# Patient Record
Sex: FEMALE | Race: WHITE | NOT HISPANIC OR LATINO
[De-identification: names, ages, dates, MRNs, and addresses within clinical notes are randomized per-mention and may not be internally consistent; named-entity substitution may affect disease eponyms.]

---

## 2017-01-10 ENCOUNTER — APPOINTMENT (OUTPATIENT)
Dept: OBGYN | Facility: HOSPITAL | Age: 37
End: 2017-01-10

## 2017-05-30 ENCOUNTER — APPOINTMENT (OUTPATIENT)
Dept: DERMATOLOGY | Facility: CLINIC | Age: 37
End: 2017-05-30

## 2017-05-30 VITALS
DIASTOLIC BLOOD PRESSURE: 60 MMHG | SYSTOLIC BLOOD PRESSURE: 110 MMHG | WEIGHT: 140 LBS | HEIGHT: 66 IN | BODY MASS INDEX: 22.5 KG/M2

## 2019-01-04 ENCOUNTER — APPOINTMENT (OUTPATIENT)
Dept: UROLOGY | Facility: CLINIC | Age: 39
End: 2019-01-04
Payer: MEDICAID

## 2019-01-04 ENCOUNTER — APPOINTMENT (OUTPATIENT)
Dept: DERMATOLOGY | Facility: CLINIC | Age: 39
End: 2019-01-04
Payer: MEDICAID

## 2019-01-04 VITALS — HEIGHT: 66 IN | BODY MASS INDEX: 22.98 KG/M2 | WEIGHT: 143 LBS

## 2019-01-04 DIAGNOSIS — L30.9 DERMATITIS, UNSPECIFIED: ICD-10-CM

## 2019-01-04 DIAGNOSIS — L90.8 OTHER ATROPHIC DISORDERS OF SKIN: ICD-10-CM

## 2019-01-04 PROCEDURE — 99214 OFFICE O/P EST MOD 30 MIN: CPT | Mod: GC

## 2019-01-04 PROCEDURE — 99214 OFFICE O/P EST MOD 30 MIN: CPT

## 2019-01-04 NOTE — PHYSICAL EXAM
[Alert] : alert [Oriented x 3] : ~L oriented x 3 [Well Nourished] : well nourished [Conjunctiva Non-injected] : conjunctiva non-injected [No Visual Lymphadenopathy] : no visual  lymphadenopathy [No Clubbing] : no clubbing [No Edema] : no edema [No Bromhidrosis] : no bromhidrosis [No Chromhidrosis] : no chromhidrosis [FreeTextEntry3] : - b/l hands: diffuse xerosis with some linear fissures noted\par - b/l periorbital area: lichenified plaques with scaling\par - scalp: erythematous papules with overlying excoriation. No significant scale

## 2019-01-04 NOTE — HISTORY OF PRESENT ILLNESS
[FreeTextEntry1] : f/u: rash on hands, eyes [de-identified] : 39 y/o F h/o kidney stones presenting for following:\par 1) rash on hands - Very dry and itchy during winter. Using Lidex ointment PRN with improvement. Not moisturizing or using gloves with chores. No other associated sx or tx tried. \par 2) rash around eyes - Flares randomly 3-4x a year. Unclear triggers. Using Regenerist soap and [employer]uder face cream. Previously patch tested but doesn't remember the results. Washes hair every 2 days. Uses perfume but no makeup.\par 3) itchy scalp - diffuse, present for years. Scratches until the skin breaks. No F/C, NS, myalgias, arthralgias. \par 4) facial wrinkles - Wondering if there are any topical treatments to improve appearance. 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

## 2019-01-06 LAB — BACTERIA UR CULT: NORMAL

## 2019-01-08 ENCOUNTER — FORM ENCOUNTER (OUTPATIENT)
Age: 39
End: 2019-01-08

## 2019-01-08 ENCOUNTER — OUTPATIENT (OUTPATIENT)
Dept: OUTPATIENT SERVICES | Facility: HOSPITAL | Age: 39
LOS: 1 days | End: 2019-01-08
Payer: MEDICAID

## 2019-01-08 VITALS
WEIGHT: 143.08 LBS | HEIGHT: 67 IN | RESPIRATION RATE: 16 BRPM | DIASTOLIC BLOOD PRESSURE: 65 MMHG | OXYGEN SATURATION: 99 % | SYSTOLIC BLOOD PRESSURE: 96 MMHG | HEART RATE: 52 BPM | TEMPERATURE: 98 F

## 2019-01-08 DIAGNOSIS — N20.1 CALCULUS OF URETER: ICD-10-CM

## 2019-01-08 DIAGNOSIS — Z98.890 OTHER SPECIFIED POSTPROCEDURAL STATES: Chronic | ICD-10-CM

## 2019-01-08 LAB
ANION GAP SERPL CALC-SCNC: 11 MMOL/L — SIGNIFICANT CHANGE UP (ref 5–17)
BUN SERPL-MCNC: 10 MG/DL — SIGNIFICANT CHANGE UP (ref 7–23)
CALCIUM SERPL-MCNC: 9.7 MG/DL — SIGNIFICANT CHANGE UP (ref 8.4–10.5)
CHLORIDE SERPL-SCNC: 101 MMOL/L — SIGNIFICANT CHANGE UP (ref 96–108)
CO2 SERPL-SCNC: 27 MMOL/L — SIGNIFICANT CHANGE UP (ref 22–31)
CREAT SERPL-MCNC: 0.66 MG/DL — SIGNIFICANT CHANGE UP (ref 0.5–1.3)
GLUCOSE SERPL-MCNC: 105 MG/DL — HIGH (ref 70–99)
HCT VFR BLD CALC: 36.4 % — SIGNIFICANT CHANGE UP (ref 34.5–45)
HGB BLD-MCNC: 12 G/DL — SIGNIFICANT CHANGE UP (ref 11.5–15.5)
MCHC RBC-ENTMCNC: 30.1 PG — SIGNIFICANT CHANGE UP (ref 27–34)
MCHC RBC-ENTMCNC: 33 GM/DL — SIGNIFICANT CHANGE UP (ref 32–36)
MCV RBC AUTO: 91.2 FL — SIGNIFICANT CHANGE UP (ref 80–100)
PLATELET # BLD AUTO: 225 K/UL — SIGNIFICANT CHANGE UP (ref 150–400)
POTASSIUM SERPL-MCNC: 3.8 MMOL/L — SIGNIFICANT CHANGE UP (ref 3.5–5.3)
POTASSIUM SERPL-SCNC: 3.8 MMOL/L — SIGNIFICANT CHANGE UP (ref 3.5–5.3)
RBC # BLD: 3.99 M/UL — SIGNIFICANT CHANGE UP (ref 3.8–5.2)
RBC # FLD: 12.7 % — SIGNIFICANT CHANGE UP (ref 10.3–14.5)
SODIUM SERPL-SCNC: 139 MMOL/L — SIGNIFICANT CHANGE UP (ref 135–145)
WBC # BLD: 4.42 K/UL — SIGNIFICANT CHANGE UP (ref 3.8–10.5)
WBC # FLD AUTO: 4.42 K/UL — SIGNIFICANT CHANGE UP (ref 3.8–10.5)

## 2019-01-08 PROCEDURE — 80048 BASIC METABOLIC PNL TOTAL CA: CPT

## 2019-01-08 PROCEDURE — G0463: CPT

## 2019-01-08 PROCEDURE — 85027 COMPLETE CBC AUTOMATED: CPT

## 2019-01-08 NOTE — H&P PST ADULT - HISTORY OF PRESENT ILLNESS
This is a 37 y/o female with PMH: ('13): Right Kidney Stone: surgically treated. Current dx: + recurrent Right Kidney This is a 39 y/o female with PMH: ('13): Right Ureteral Calculus : surgically treated. Current dx: recurrent Right Ureteral Calculus. Scheduled: Cystoscopy/ Right Ureteroscopy/ laser Lithotripsy/ Right Stent Insertion.

## 2019-01-08 NOTE — H&P PST ADULT - NSANTHOSAYNRD_GEN_A_CORE
No. LUCINA screening performed.  STOP BANG Legend: 0-2 = LOW Risk; 3-4 = INTERMEDIATE Risk; 5-8 = HIGH Risk

## 2019-01-09 ENCOUNTER — APPOINTMENT (OUTPATIENT)
Dept: UROLOGY | Facility: HOSPITAL | Age: 39
End: 2019-01-09

## 2019-01-09 ENCOUNTER — OUTPATIENT (OUTPATIENT)
Dept: OUTPATIENT SERVICES | Facility: HOSPITAL | Age: 39
LOS: 1 days | End: 2019-01-09
Payer: MEDICAID

## 2019-01-09 DIAGNOSIS — Z98.890 OTHER SPECIFIED POSTPROCEDURAL STATES: Chronic | ICD-10-CM

## 2019-01-09 PROCEDURE — 81025 URINE PREGNANCY TEST: CPT

## 2019-01-09 PROCEDURE — 74176 CT ABD & PELVIS W/O CONTRAST: CPT

## 2019-01-10 PROBLEM — Z64.1 PROBLEMS RELATED TO MULTIPARITY: Chronic | Status: ACTIVE | Noted: 2019-01-08

## 2019-01-31 DIAGNOSIS — N20.1 CALCULUS OF URETER: ICD-10-CM

## 2019-02-15 ENCOUNTER — APPOINTMENT (OUTPATIENT)
Dept: DERMATOLOGY | Facility: CLINIC | Age: 39
End: 2019-02-15

## 2019-08-26 ENCOUNTER — APPOINTMENT (OUTPATIENT)
Dept: DERMATOLOGY | Facility: CLINIC | Age: 39
End: 2019-08-26
Payer: MEDICAID

## 2019-08-26 DIAGNOSIS — Q82.5 CONGENITAL NON-NEOPLASTIC NEVUS: ICD-10-CM

## 2019-08-26 PROCEDURE — 99214 OFFICE O/P EST MOD 30 MIN: CPT

## 2020-01-07 ENCOUNTER — OUTPATIENT (OUTPATIENT)
Dept: OUTPATIENT SERVICES | Facility: HOSPITAL | Age: 40
LOS: 1 days | End: 2020-01-07
Payer: COMMERCIAL

## 2020-01-07 ENCOUNTER — APPOINTMENT (OUTPATIENT)
Dept: UROLOGY | Facility: CLINIC | Age: 40
End: 2020-01-07

## 2020-01-07 ENCOUNTER — APPOINTMENT (OUTPATIENT)
Dept: UROLOGY | Facility: CLINIC | Age: 40
End: 2020-01-07
Payer: COMMERCIAL

## 2020-01-07 DIAGNOSIS — N20.1 CALCULUS OF URETER: ICD-10-CM

## 2020-01-07 DIAGNOSIS — R35.0 FREQUENCY OF MICTURITION: ICD-10-CM

## 2020-01-07 DIAGNOSIS — Z98.890 OTHER SPECIFIED POSTPROCEDURAL STATES: Chronic | ICD-10-CM

## 2020-01-07 PROCEDURE — 76775 US EXAM ABDO BACK WALL LIM: CPT | Mod: 26

## 2020-01-07 PROCEDURE — 99213 OFFICE O/P EST LOW 20 MIN: CPT | Mod: 25

## 2020-01-07 PROCEDURE — 76775 US EXAM ABDO BACK WALL LIM: CPT

## 2020-01-08 PROBLEM — N20.1 RIGHT URETERAL STONE: Status: RESOLVED | Noted: 2019-01-04 | Resolved: 2020-01-08

## 2020-01-13 DIAGNOSIS — N20.1 CALCULUS OF URETER: ICD-10-CM

## 2021-01-08 ENCOUNTER — APPOINTMENT (OUTPATIENT)
Dept: UROLOGY | Facility: CLINIC | Age: 41
End: 2021-01-08
Payer: COMMERCIAL

## 2021-01-08 ENCOUNTER — OUTPATIENT (OUTPATIENT)
Dept: OUTPATIENT SERVICES | Facility: HOSPITAL | Age: 41
LOS: 1 days | End: 2021-01-08
Payer: MEDICAID

## 2021-01-08 DIAGNOSIS — Z98.890 OTHER SPECIFIED POSTPROCEDURAL STATES: Chronic | ICD-10-CM

## 2021-01-08 DIAGNOSIS — R35.0 FREQUENCY OF MICTURITION: ICD-10-CM

## 2021-01-08 PROCEDURE — 99213 OFFICE O/P EST LOW 20 MIN: CPT | Mod: 25

## 2021-01-08 PROCEDURE — 99072 ADDL SUPL MATRL&STAF TM PHE: CPT

## 2021-01-08 PROCEDURE — 76775 US EXAM ABDO BACK WALL LIM: CPT | Mod: 26

## 2021-01-08 PROCEDURE — 76775 US EXAM ABDO BACK WALL LIM: CPT

## 2021-01-09 NOTE — LETTER BODY
[FreeTextEntry1] : Mounika Mendoza MD\par [address] [place] [address]\[place] [address]\par \par Dear Dr. Mendoza,\par \par Jocelin Rudolph returns to the office today.  She is a 40-year-old woman with history of kidney stones who had previously undergone ureteroscopy to remove an obstructing right-sided ureteral stone a few years ago.  She is back today to undergo follow-up for her kidneys with renal ultrasound.  She has not had any interval issues of flank pain or gross hematuria since her visit with me last year.  She denies any urinary tract infections.  She has had no bother related to urinary urgency, frequency, incontinence, or dysuria.  Her appetite and her weight have been stable.  She continues to report to me that she is drinking a large amount of fluids to try to help minimize the risk of recurrent stone.  \par \par Renal ultrasound today demonstrates no evidence of stone in either kidney.  She has no hydronephrosis on either side.  There are no solid renal masses.  Both kidneys appear normal in their size and echogenicity.\par \par Based on these findings and her lack of symptoms, she does not need any other urologic intervention at this time.  Given that she is now free of stones for the last couple of visits, I have recommended that we can increase the interval between evaluations up to 2 years assuming she does maintain good fluid intake and is free of any symptoms.  She does understand she can contact me at any time should she develop any new onset symptoms of flank pain or hematuria.  Please do not hesitate to contact me with any questions or concerns.\par \par Sincerely,\par \par \par \par \par Bobby Perdomo MD, FACS\par  of Urology\par Residency \par [employer] [facility] [employer] [facility]\par \par [facility]\par Director of Robotics and Minimally Invasive Surgery\par [address] [place] [address]\[place] [address]\par P: [phone]\par F: [phone]\par [employer]\par Denies any flank pain

## 2021-01-12 DIAGNOSIS — N20.0 CALCULUS OF KIDNEY: ICD-10-CM

## 2021-02-08 ENCOUNTER — APPOINTMENT (OUTPATIENT)
Dept: DERMATOLOGY | Facility: CLINIC | Age: 41
End: 2021-02-08
Payer: COMMERCIAL

## 2021-02-08 DIAGNOSIS — L98.8 OTHER SPECIFIED DISORDERS OF THE SKIN AND SUBCUTANEOUS TISSUE: ICD-10-CM

## 2021-02-08 DIAGNOSIS — Z87.2 PERSONAL HISTORY OF DISEASES OF THE SKIN AND SUBCUTANEOUS TISSUE: ICD-10-CM

## 2021-02-08 DIAGNOSIS — R21 RASH AND OTHER NONSPECIFIC SKIN ERUPTION: ICD-10-CM

## 2021-02-08 PROCEDURE — 99072 ADDL SUPL MATRL&STAF TM PHE: CPT

## 2021-02-08 PROCEDURE — 99214 OFFICE O/P EST MOD 30 MIN: CPT

## 2021-03-10 ENCOUNTER — APPOINTMENT (OUTPATIENT)
Dept: DERMATOLOGY | Facility: CLINIC | Age: 41
End: 2021-03-10
Payer: COMMERCIAL

## 2021-03-10 DIAGNOSIS — L30.9 DERMATITIS, UNSPECIFIED: ICD-10-CM

## 2021-03-10 DIAGNOSIS — H01.9 UNSPECIFIED INFLAMMATION OF EYELID: ICD-10-CM

## 2021-03-10 PROCEDURE — 99072 ADDL SUPL MATRL&STAF TM PHE: CPT

## 2021-03-10 PROCEDURE — 99214 OFFICE O/P EST MOD 30 MIN: CPT

## 2021-03-10 RX ORDER — FLUOCINONIDE 0.5 MG/ML
0.05 SOLUTION TOPICAL
Qty: 1 | Refills: 3 | Status: DISCONTINUED | COMMUNITY
Start: 2019-01-04 | End: 2021-03-10

## 2021-03-10 RX ORDER — FLUOCINONIDE 0.05 MG/G
0.05 OINTMENT TOPICAL TWICE DAILY
Qty: 1 | Refills: 1 | Status: DISCONTINUED | COMMUNITY
Start: 2017-05-30 | End: 2021-03-10

## 2022-03-07 ENCOUNTER — APPOINTMENT (OUTPATIENT)
Dept: DERMATOLOGY | Facility: CLINIC | Age: 42
End: 2022-03-07
Payer: COMMERCIAL

## 2022-03-07 VITALS — BODY MASS INDEX: 22.44 KG/M2 | HEIGHT: 67 IN | WEIGHT: 143 LBS

## 2022-03-07 DIAGNOSIS — B36.0 PITYRIASIS VERSICOLOR: ICD-10-CM

## 2022-03-07 DIAGNOSIS — L30.9 DERMATITIS, UNSPECIFIED: ICD-10-CM

## 2022-03-07 DIAGNOSIS — L81.8 OTHER SPECIFIED DISORDERS OF PIGMENTATION: ICD-10-CM

## 2022-03-07 PROCEDURE — 99214 OFFICE O/P EST MOD 30 MIN: CPT

## 2022-03-07 RX ORDER — IBUPROFEN 800 MG/1
800 TABLET, FILM COATED ORAL
Qty: 30 | Refills: 0 | Status: ACTIVE | COMMUNITY
Start: 2021-12-10

## 2022-03-08 RX ORDER — DOXYCYCLINE 100 MG/1
100 TABLET, FILM COATED ORAL
Qty: 28 | Refills: 0 | Status: DISCONTINUED | COMMUNITY
Start: 2021-03-10 | End: 2022-03-08

## 2022-03-15 NOTE — H&P PST ADULT - LYMPH NODES
Returned call to mother after consulting with AAYUSH Wheatley NP- pt needs to be evaluated by PCP to have spleen evaluated to receive return to gym slip. Mother states understanding.   No lymphadedenopathy

## 2022-06-13 ENCOUNTER — EMERGENCY (EMERGENCY)
Facility: HOSPITAL | Age: 42
LOS: 1 days | Discharge: HOME | End: 2022-06-13
Attending: STUDENT IN AN ORGANIZED HEALTH CARE EDUCATION/TRAINING PROGRAM | Admitting: STUDENT IN AN ORGANIZED HEALTH CARE EDUCATION/TRAINING PROGRAM
Payer: COMMERCIAL

## 2022-06-13 VITALS
TEMPERATURE: 98 F | SYSTOLIC BLOOD PRESSURE: 104 MMHG | HEIGHT: 67 IN | HEART RATE: 61 BPM | DIASTOLIC BLOOD PRESSURE: 75 MMHG | RESPIRATION RATE: 16 BRPM | OXYGEN SATURATION: 100 %

## 2022-06-13 VITALS
RESPIRATION RATE: 16 BRPM | OXYGEN SATURATION: 100 % | DIASTOLIC BLOOD PRESSURE: 72 MMHG | HEART RATE: 86 BPM | SYSTOLIC BLOOD PRESSURE: 109 MMHG

## 2022-06-13 DIAGNOSIS — Z98.890 OTHER SPECIFIED POSTPROCEDURAL STATES: Chronic | ICD-10-CM

## 2022-06-13 PROCEDURE — 99283 EMERGENCY DEPT VISIT LOW MDM: CPT

## 2022-06-13 PROCEDURE — 73620 X-RAY EXAM OF FOOT: CPT | Mod: 26,RT

## 2022-06-13 PROCEDURE — 73610 X-RAY EXAM OF ANKLE: CPT | Mod: 26,RT

## 2022-06-13 NOTE — ED PROVIDER NOTE - ATTENDING CONTRIBUTION TO CARE
I have personally performed a face to face medical and diagnostic evaluation of the patient. I have discussed with and reviewed the Resident's and/or ACP's and/or Medical/PA/NP student's note and agree with the History, ROS, Physical Exam and MDM unless otherwise indicated. A brief summary of my personal evaluation and impression can be found below.    41F no pmh presents with a cc of R ankle pain s/p rolling ankle down x1 stair today no head trauma no LOC recalls event is able to walk but w some discomfort now becoming harder to walk. Denies numbness, tingling or loss of sensation. Denies loss of motor function. No knee pain. Denies n/v/f/c/cp/sob. Denies headache, syncope, lightheadedness, dizziness. Denies chest palpitations, abdominal pain. Denies edema. Denies dysuria, hematuria, BRBPR, tarry stools, diarrhea, constipation.     All other ROS negative, except as above and as per HPI and ROS section.    VITALS: Initial triage and subsequent vitals have been reviewed by me.  GEN APPEARANCE: WDWN, alert, non-toxic, NAD  HEAD: Atraumatic.  EYES: PERRLa, EOMI, vision grossly intact.   NECK: Supple  CV: RRR, S1S2, no c/r/m/g. Cap refill <2 seconds. No bruits.   LUNGS: CTAB. No abnormal breath sounds.  ABDOMEN: Soft, NTND. No guarding or rebound.   MSK/EXT: mild R lat mal ttp +2 DP RLE No R knee ttp. No CVA ttp. Pelvis stable. No peripheral edema.  NEURO: Alert, follows commands. Weight bearing normal. Speech normal. Sensation and motor normal x4 extremities.   SKIN: Warm, dry and intact. No rash.  PSYCH: Appropriate    Plan/MDM: 41F no pmh presents with a cc of R ankle pain s/p rolling ankle down x1 stair today no head trauma no LOC recalls event is able to walk but w some discomfort now becoming harder to walk. Denies numbness, tingling or loss of sensation. Denies loss of motor function. No knee pain.  exam vss non toxic PE as above ddx c/f RLE sprain/strain v fx will get xr meds as needed and reassess.

## 2022-06-13 NOTE — ED PROVIDER NOTE - NSFOLLOWUPINSTRUCTIONS_ED_ALL_ED_FT
Thank you for visiting our Emergency Department, it has been a pleasure taking part in your healthcare.    Your discharge diagnosis is: ankle injury  Please take all discharge medications as indicated below:  Take Motrin/Tylenol for pain as needed, please follow instructions on manufacturers label. If you have any questions please consult a pharmacist or your PMD.  Please follow up with your PMD within x48 hours.  A copy of resulted labs, imaging, and findings have been provided to you.   You have had a detailed discussion with your provider regarding your diagnosis, care management and discharge planning including, but not limited to: return precautions, follow up visits with existing or new providers, new prescriptions and/or medication changes, wound and/or splint/cast care or other care   aspects specific to your diagnosis and treatment. You have been given the opportunity to have your questions answered. At this time you have been deemed stable and fit for discharge.  Return precautions to the Emergency Department include but are not limited to: unrelenting nausea, vomiting, fever, chills, chest pain, shortness of breath, dizziness, chest or abdominal pain, worsening back pain, syncope, blood in urine or stool, headache that doesn't resolve, numbness or tingling, loss of sensation, loss of motor function, or any other concerning symptoms.  Please follow up with orthopedics within x48 hours.  A list of providers for follow up has been given to you.

## 2022-06-13 NOTE — ED PROVIDER NOTE - PATIENT PORTAL LINK FT
You can access the FollowMyHealth Patient Portal offered by [facility] by registering at the following website: [email or web address]. By joining [email or web address]’s FollowMyHealth portal, you will also be able to view your health information using other applications (apps) compatible with our system.

## 2022-06-13 NOTE — ED PROVIDER NOTE - ATTENDING APP SHARED VISIT CONTRIBUTION OF CARE
I have personally performed a face to face medical and diagnostic evaluation of the patient. I have discussed with and reviewed the ALLYN's note and agree with the History, ROS, Physical Exam and MDM unless otherwise indicated. A brief summary of my personal evaluation and impression can be found below.    1F no pmh presents with a cc of R ankle pain s/p rolling ankle down x1 stair today no head trauma no LOC recalls event is able to walk but w some discomfort now becoming harder to walk. Denies numbness, tingling or loss of sensation. Denies loss of motor function. No knee pain. Denies n/v/f/c/cp/sob. Denies headache, syncope, lightheadedness, dizziness. Denies chest palpitations, abdominal pain. Denies edema. Denies dysuria, hematuria, BRBPR, tarry stools, diarrhea, constipation.     All other ROS negative, except as above and as per HPI and ROS section.    VITALS: Initial triage and subsequent vitals have been reviewed by me.  GEN APPEARANCE: WDWN, alert, non-toxic, NAD  HEAD: Atraumatic.  EYES: PERRLa, EOMI, vision grossly intact.   NECK: Supple  CV: RRR, S1S2, no c/r/m/g. Cap refill <2 seconds. No bruits.   LUNGS: CTAB. No abnormal breath sounds.  ABDOMEN: Soft, NTND. No guarding or rebound.   MSK/EXT: mild R lat mal ttp +2 DP RLE No R knee ttp. No CVA ttp. Pelvis stable. No peripheral edema.  NEURO: Alert, follows commands. Weight bearing normal. Speech normal. Sensation and motor normal x4 extremities.   SKIN: Warm, dry and intact. No rash.  PSYCH: Appropriate    Plan/MDM: 41F no pmh presents with a cc of R ankle pain s/p rolling ankle down x1 stair today no head trauma no LOC recalls event is able to walk but w some discomfort now becoming harder to walk. Denies numbness, tingling or loss of sensation. Denies loss of motor function. No knee pain.  exam vss non toxic PE as above ddx c/f RLE sprain/strain v fx will get xr meds as needed and reassess.
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
## 2022-06-13 NOTE — ED PROVIDER NOTE - NSICDXPASTSURGICALHX_GEN_ALL_CORE_FT
PAST SURGICAL HISTORY:  No Past Surgical History     Status post cystoscopy ' 2013   Right Ureteral Stone Extraction

## 2022-06-13 NOTE — ED PROVIDER NOTE - NS ED ATTENDING STATEMENT MOD
This was a shared visit with the ALLYN. I reviewed and verified the documentation and independently performed the documented: I have seen and examined this patient and fully participated in the care of this patient as the teaching attending.  The service was shared with the ALLYN.  I reviewed and verified the documentation and independently performed the documented:

## 2022-06-13 NOTE — ED PROVIDER NOTE - OBJECTIVE STATEMENT
40 y/o female with no significant PMHx presents with 1 day of right ankle pain. Pt reports that she missed a step walking down stairs this morning and internally rotated her right ankle. She was initially able to walk after the injury but since it has become increasingly painful to bear weight. Denies head injury, LOC, dizziness, recent illness, fever, chest pain, SOB, numbness or tingling. 42 y/o female with no significant PMHx presents with 1 day of right ankle pain. Pt reports that she missed a step walking down stairs this morning and internally rotated her right ankle. She was initially able to walk after the injury but since it has become increasingly painful to bear weight on the ankle or walk. Denies head injury, LOC, dizziness, recent illness, fever, chest pain, SOB, numbness or tingling.

## 2022-06-13 NOTE — ED PROVIDER NOTE - NSFOLLOWUPCLINICS_GEN_ALL_ED_FT
[facility] Orthopedic [facility]  Orthopedic Surgery  [address], 3rd & 4th [place] [address]  Phone: [phone]  Fax:     [facility]  Orthopedic Surgery  [address] [address]  Phone: [phone]  Fax:

## 2022-06-13 NOTE — ED PROVIDER NOTE - NSICDXPASTMEDICALHX_GEN_ALL_CORE_FT
PAST MEDICAL HISTORY:  Multiparity      (normal spontaneous vaginal delivery) x 5 - last 2012    Ureteral calculus, right

## 2022-06-13 NOTE — ED PROVIDER NOTE - CLINICAL SUMMARY MEDICAL DECISION MAKING FREE TEXT BOX
40 y/o female with no significant PMHx presents with 1 day of right ankle pain. On physical exam mild swelling and tenderness noted to  lateral malleolus, full sensation and +2 pulses bilaterally. Imp: r/o ankle fracture vs sprain   Plan: X-ray of right ankle and foot, NSAIDs prn

## 2023-01-05 ENCOUNTER — APPOINTMENT (OUTPATIENT)
Dept: UROLOGY | Facility: CLINIC | Age: 43
End: 2023-01-05
Payer: MEDICAID

## 2023-01-05 ENCOUNTER — OUTPATIENT (OUTPATIENT)
Dept: OUTPATIENT SERVICES | Facility: HOSPITAL | Age: 43
LOS: 1 days | End: 2023-01-05
Payer: MEDICAID

## 2023-01-05 VITALS
HEIGHT: 67 IN | SYSTOLIC BLOOD PRESSURE: 107 MMHG | HEART RATE: 59 BPM | WEIGHT: 143 LBS | BODY MASS INDEX: 22.44 KG/M2 | DIASTOLIC BLOOD PRESSURE: 69 MMHG

## 2023-01-05 DIAGNOSIS — Z87.442 PERSONAL HISTORY OF URINARY CALCULI: ICD-10-CM

## 2023-01-05 DIAGNOSIS — Z98.890 OTHER SPECIFIED POSTPROCEDURAL STATES: Chronic | ICD-10-CM

## 2023-01-05 PROCEDURE — 99213 OFFICE O/P EST LOW 20 MIN: CPT | Mod: 25

## 2023-01-05 PROCEDURE — 76775 US EXAM ABDO BACK WALL LIM: CPT | Mod: 26

## 2023-01-05 PROCEDURE — 76775 US EXAM ABDO BACK WALL LIM: CPT

## 2023-01-07 NOTE — ASSESSMENT
[FreeTextEntry1] : Ultrasound reassessment of the kidneys today shows no evidence of stone disease.  There was no hydronephrosis noted.  She is also quite asymptomatic.  Both kidneys show normal size and echogenicity.  With these findings in mind, I would continue to recommend that she focus on prophylaxis with high fluid intake and low-sodium diet.  We reviewed this again today as well as trying to keep oxalate to a low level in her diet.  These should all help minimize her risk of additional stone burden in the future.  I will follow her intermittently and recommended she could have 2 to 3-year intervals between ultrasounds at this point given that she has been free of stones for quite a number of years.

## 2023-01-07 NOTE — HISTORY OF PRESENT ILLNESS
[FreeTextEntry1] : Jocelin Rudolph returns to the office today.  She is a 42-year-old woman with history of kidney stones.  She was last here about 2 years ago and has been free of stones for quite some time.  I had advised her that she could have a long interval between ultrasound follow-up for this reason she now presents for this purpose.  She has not had any new onset symptoms of flank pain or hematuria.  She denies nausea or vomiting.  She has not passed any stones.  She also denies any other new interval development of medical condition since her last visit with me in January 2021.

## 2023-01-09 DIAGNOSIS — Z87.442 PERSONAL HISTORY OF URINARY CALCULI: ICD-10-CM

## 2023-01-12 ENCOUNTER — APPOINTMENT (OUTPATIENT)
Dept: UROLOGY | Facility: CLINIC | Age: 43
End: 2023-01-12

## 2023-06-29 NOTE — H&P PST ADULT - REASON FOR ADMISSION
Daughter reports pt was seen for back pain Tuesday. Reports weakness, tremors in arms and legs, lethargic, and multiple falls that started on Wednesday. Daughter reports pt has been falling asleep while being talked to. Started alendronate on Sunday, trulicity on Monday and sprinolactone 6/23. Pt reports she is feeling fine. A&ox4. Now reports feeling short of breath, +hx PE. +blood thinners. Respirations easy, nonlabored. o2 sat 90% on room air. " I have a recurrent Right Kidney Stone"

## 2023-11-20 ENCOUNTER — APPOINTMENT (OUTPATIENT)
Dept: SPINE | Facility: CLINIC | Age: 43
End: 2023-11-20
Payer: MEDICAID

## 2023-11-20 VITALS
HEART RATE: 78 BPM | HEIGHT: 67 IN | SYSTOLIC BLOOD PRESSURE: 115 MMHG | BODY MASS INDEX: 22.76 KG/M2 | WEIGHT: 145 LBS | OXYGEN SATURATION: 99 % | DIASTOLIC BLOOD PRESSURE: 83 MMHG

## 2023-11-20 DIAGNOSIS — M54.9 DORSALGIA, UNSPECIFIED: ICD-10-CM

## 2023-11-20 PROCEDURE — 99203 OFFICE O/P NEW LOW 30 MIN: CPT

## 2023-11-20 RX ORDER — TACROLIMUS 1 MG/G
0.1 OINTMENT TOPICAL
Qty: 1 | Refills: 4 | Status: DISCONTINUED | COMMUNITY
Start: 2019-01-04 | End: 2023-11-20

## 2023-11-20 RX ORDER — BETAMETHASONE DIPROPIONATE 0.5 MG/G
0.05 OINTMENT, AUGMENTED TOPICAL
Qty: 1 | Refills: 1 | Status: DISCONTINUED | COMMUNITY
Start: 2021-03-10 | End: 2023-11-20

## 2023-11-20 RX ORDER — AMOXICILLIN AND CLAVULANATE POTASSIUM 500; 125 MG/1; MG/1
500-125 TABLET, FILM COATED ORAL
Qty: 20 | Refills: 0 | Status: DISCONTINUED | COMMUNITY
Start: 2021-12-10 | End: 2023-11-20

## 2023-11-20 RX ORDER — TRETINOIN 0.25 MG/G
0.03 CREAM TOPICAL
Qty: 1 | Refills: 5 | Status: DISCONTINUED | COMMUNITY
Start: 2019-01-04 | End: 2023-11-20

## 2023-11-20 RX ORDER — FLUCONAZOLE 200 MG/1
200 TABLET ORAL
Qty: 1 | Refills: 0 | Status: DISCONTINUED | COMMUNITY
Start: 2022-03-07 | End: 2023-11-20

## 2023-11-20 RX ORDER — METRONIDAZOLE 7.5 MG/G
0.75 CREAM TOPICAL
Qty: 1 | Refills: 2 | Status: DISCONTINUED | COMMUNITY
Start: 2021-02-08 | End: 2023-11-20

## 2023-11-20 RX ORDER — HYDROCORTISONE 25 MG/G
2.5 CREAM TOPICAL
Qty: 1 | Refills: 0 | Status: DISCONTINUED | COMMUNITY
Start: 2021-03-10 | End: 2023-11-20

## 2023-11-20 RX ORDER — TRETINOIN 0.5 MG/G
0.05 CREAM TOPICAL
Qty: 1 | Refills: 6 | Status: DISCONTINUED | COMMUNITY
Start: 2021-02-08 | End: 2023-11-20

## 2023-11-20 RX ORDER — KETOCONAZOLE 20 MG/G
2 CREAM TOPICAL
Qty: 1 | Refills: 3 | Status: DISCONTINUED | COMMUNITY
Start: 2019-08-26 | End: 2023-11-20

## 2023-11-20 RX ORDER — KETOCONAZOLE 20.5 MG/ML
2 SHAMPOO, SUSPENSION TOPICAL
Qty: 1 | Refills: 11 | Status: DISCONTINUED | COMMUNITY
Start: 2019-08-26 | End: 2023-11-20